# Patient Record
Sex: MALE | Race: BLACK OR AFRICAN AMERICAN | Employment: FULL TIME | URBAN - METROPOLITAN AREA
[De-identification: names, ages, dates, MRNs, and addresses within clinical notes are randomized per-mention and may not be internally consistent; named-entity substitution may affect disease eponyms.]

---

## 2022-09-27 ENCOUNTER — HOSPITAL ENCOUNTER (EMERGENCY)
Age: 42
Discharge: HOME | End: 2022-09-27
Attending: EMERGENCY MEDICINE

## 2022-09-27 ENCOUNTER — APPOINTMENT (OUTPATIENT)
Dept: GENERAL RADIOLOGY | Age: 42
End: 2022-09-27

## 2022-09-27 VITALS
RESPIRATION RATE: 16 BRPM | OXYGEN SATURATION: 100 % | BODY MASS INDEX: 29.06 KG/M2 | TEMPERATURE: 98.4 F | HEIGHT: 70 IN | SYSTOLIC BLOOD PRESSURE: 168 MMHG | DIASTOLIC BLOOD PRESSURE: 125 MMHG | WEIGHT: 203 LBS | HEART RATE: 82 BPM

## 2022-09-27 DIAGNOSIS — R03.0 ELEVATED BLOOD PRESSURE READING: ICD-10-CM

## 2022-09-27 DIAGNOSIS — M79.644 FINGER PAIN, RIGHT: ICD-10-CM

## 2022-09-27 DIAGNOSIS — M70.52 SUPRAPATELLAR BURSITIS OF LEFT KNEE: Primary | ICD-10-CM

## 2022-09-27 PROCEDURE — 73140 X-RAY EXAM OF FINGER(S): CPT

## 2022-09-27 PROCEDURE — 73562 X-RAY EXAM OF KNEE 3: CPT

## 2022-09-27 PROCEDURE — 99283 EMERGENCY DEPT VISIT LOW MDM: CPT

## 2022-09-27 PROCEDURE — 6370000000 HC RX 637 (ALT 250 FOR IP): Performed by: EMERGENCY MEDICINE

## 2022-09-27 RX ORDER — GLIPIZIDE 5 MG/1
5 TABLET ORAL
COMMUNITY

## 2022-09-27 RX ORDER — NAPROXEN 250 MG/1
250 TABLET ORAL 2 TIMES DAILY PRN
Qty: 20 TABLET | Refills: 0 | Status: SHIPPED | OUTPATIENT
Start: 2022-09-27

## 2022-09-27 RX ORDER — NAPROXEN 250 MG/1
500 TABLET ORAL ONCE
Status: COMPLETED | OUTPATIENT
Start: 2022-09-27 | End: 2022-09-27

## 2022-09-27 RX ORDER — ESOMEPRAZOLE MAGNESIUM 20 MG/1
20 FOR SUSPENSION ORAL
COMMUNITY

## 2022-09-27 RX ADMIN — NAPROXEN 500 MG: 250 TABLET ORAL at 07:46

## 2022-09-27 ASSESSMENT — PAIN SCALES - GENERAL
PAINLEVEL_OUTOF10: 9
PAINLEVEL_OUTOF10: 9

## 2022-09-27 ASSESSMENT — PAIN - FUNCTIONAL ASSESSMENT: PAIN_FUNCTIONAL_ASSESSMENT: 0-10

## 2022-09-27 NOTE — ED PROVIDER NOTES
[address]      CHIEF COMPLAINT  Knee Pain (Pt states that he went to a party Saturday night and then after he noticed that his left knee has a knot and started hurting. Pt states that he didn't hear a pop or twist it but did notice it hurting. Pt states he is a  and walking on his leg hurts as well as getting in and out. Pt states that he also went bowling a few months ago and his right ring finger started hurting and never has stopped )       HISTORY OF PRESENT ILLNESS  Karli Morocho is a 36 y.o. male  who presents to the ED complaining of left knee pain. Symptoms started on Sunday. He states that it hurts worse when he tries to walk or tries to extend his knee or his own power. Hurts superior to the knee the suprapatellar area feels swollen. Is tender when he touches it. No fevers. He denies any fall or direct trauma. He does not twisted that he is aware of. He did go to a party the night before but was not doing anything particularly exertional or dancing a lot. He states that he works as a  and is concerned about this pain is worse walking and getting in and out of the vehicle. He denies any numbness tingling or weakness. He has taken Tylenol for the pain but is persisted. He also is complaining of some discomfort to the right ring finger has been ongoing since bowling quite a bit ago. He states that especially when he flexes and extends the finger feels that it cracks and pops. It is painful especially when he does that but is able to fully move it without any focal numbness tingling or weakness. No other complaints, modifying factors or associated symptoms. I have reviewed the following from the nursing documentation. History reviewed. No pertinent past medical history. History reviewed. No pertinent surgical history. History reviewed. No pertinent family history.   Social History     Socioeconomic History    Marital status: Single     Spouse name: Not on file    Number of children: Not on file    Years of education: Not on file    Highest education level: Not on file   Occupational History    Not on file   Tobacco Use    Smoking status: Some Days     Types: Cigarettes    Smokeless tobacco: Never   Substance and Sexual Activity    Alcohol use: Yes    Drug use: Yes     Types: Marijuana Charmayne Stai)     Comment: twice a week    Sexual activity: Not on file   Other Topics Concern    Not on file   Social History Narrative    Not on file     Social Determinants of Health     Financial Resource Strain: Not on file   Food Insecurity: Not on file   Transportation Needs: Not on file   Physical Activity: Not on file   Stress: Not on file   Social Connections: Not on file   Intimate Partner Violence: Not on file   Housing Stability: Not on file     No current facility-administered medications for this encounter. Current Outpatient Medications   Medication Sig Dispense Refill    glipiZIDE (GLUCOTROL) 5 MG tablet Take 5 mg by mouth 2 times daily (before meals)      metFORMIN (GLUCOPHAGE) 500 MG tablet Take 500 mg by mouth 2 times daily (with meals)      esomeprazole Magnesium (NEXIUM) 20 MG PACK Take 20 mg by mouth      naproxen (NAPROSYN) 250 MG tablet Take 1 tablet by mouth 2 times daily as needed for Pain Take with food 20 tablet 0     No Known Allergies    REVIEW OF SYSTEMS  10 systems reviewed, pertinent positives per HPI otherwise noted to be negative. PHYSICAL EXAM  BP (!) 165/114   Pulse 82   Temp 98.4 °F (36.9 °C) (Oral)   Resp 16   Ht 5' 10\" (1.778 m)   Wt 203 lb (92.1 kg)   SpO2 100%   BMI 29.13 kg/m²    GENERAL APPEARANCE: Awake and alert. Cooperative. No acute distress. HENT: Normocephalic. Atraumatic. Mucous membranes are moist.  No drooling or stridor. NECK: Supple. EYES: PERRL. EOM's grossly intact. HEART/CHEST: RRR. No murmurs. 2+ radial pulse on the right. LUNGS: Respirations unlabored. CTAB. Good air exchange. Speaking comfortably in full sentences. ABDOMEN: No tenderness. Soft. Non-distended. No masses. No organomegaly. No guarding or rebound. MUSCULOSKELETAL: Patient with swelling in the suprapatellar area the left knee with tenderness especially to that area but also diffuse discomfort anteriorly with very small left knee effusion also noted. No tenderness in the popliteal fossa or palpable cords. Patient able to flex and extend the knee but with pain. No laxity on valgus and varus. Negative anterior and posterior drawer signs. No overlying erythema, rashes or increased warmth to the knee. Patient with tenderness over the right fourth PIP joint without any warmth or erythema. Compartments soft. No deformity. All extremities neurovascularly intact. SKIN: Warm and dry. No acute rashes. NEUROLOGICAL: Alert and oriented. CN's 2-12 intact. No gross facial drooping. Strength 5/5, sensation intact. No gross focal deficits. PSYCHIATRIC: Normal mood and affect. LABS  I have reviewed all labs for this visit. No results found for this visit on 09/27/22. RADIOLOGY  XR KNEE LEFT (3 VIEWS)    Result Date: 9/27/2022  EXAMINATION: THREE XRAY VIEWS OF THE LEFT KNEE 9/27/2022 7:45 am COMPARISON: None. HISTORY: ORDERING SYSTEM PROVIDED HISTORY: left suprapatellar pain/swelling TECHNOLOGIST PROVIDED HISTORY: Reason for exam:->left suprapatellar pain/swelling FINDINGS: No evidence of acute fracture or dislocation. No focal osseous lesion. No evidence of joint effusion. No focal soft tissue abnormality. No acute abnormality of the knee. XR FINGER RIGHT (MIN 2 VIEWS)    No evidence of acute fracture. Follow-up examination recommended in 7-10 days if clinically indicated. ED COURSE/MDM  Patient seen and evaluated. Old records reviewed. Imaging reviewed and results discussed with patient. Patient presenting for evaluation of left knee pain and has likely suprapatellar bursitis.   He declined crutches for some alleviation of weightbearing but I will place him in an Ace bandage and we discussed supportive exercises and need for orthopedic follow-up. I will give him a short course of NSAIDs but also discussed other treatments that may be helpful such as topical NSAIDs and lieu of the oral medications if possible. He was given orthopedic referral.  He does have some right ring finger pain from previous injury while bowling I suspect that he likely sprained his finger. Will treat supportively with exercises as it has now been several months since that initial injury and have him follow-up with orthopedics regarding them as well. Plain films of both the knee and the finger were unremarkable. No evidence of any ongoing infection. Patient and his significant other were in agreement of that plan and all questions were answered at time of discharge. Of note, patient's blood pressure was elevated and he will follow-up as an outpatient to recheck that as well and this was discussed with him as well. I estimate there is LOW risk for COMPARTMENT SYNDROME, DEEP VENOUS THROMBOSIS, SEPTIC ARTHRITIS, TENDON OR NEUROVASCULAR INJURY, thus I consider the discharge disposition reasonable. Gera Shaw and I have discussed the diagnosis and risks, and we agree with discharging home to follow-up with their primary doctor or the referral orthopedist. We also discussed returning to the Emergency Department immediately if new or worsening symptoms occur. We have discussed the symptoms which are most concerning (e.g., changing or worsening pain, numbness, weakness) that necessitate immediate return. I, Dr. Sergey Petersen MD, am the primary clinician of record. Is this patient to be included in the SEP-1 Core Measure? No   Exclusion criteria - the patient is NOT to be included for SEP-1 Core Measure due to:   Infection is not suspected     During the patient's ED course, the patient was given:  Medications naproxen (NAPROSYN) tablet 500 mg (500 mg Oral Given 9/27/22 0746)        CLINICAL IMPRESSION  1. Suprapatellar bursitis of left knee    2. Finger pain, right    3. Elevated blood pressure reading        Blood pressure (!) 165/114, pulse 82, temperature 98.4 °F (36.9 °C), temperature source Oral, resp. rate 16, height 5' 10\" (1.778 m), weight 203 lb (92.1 kg), SpO2 100 %. DISPOSITION  Domitila Escalante was discharged to home in stable condition. Patient was given scripts for the following medications. I counseled patient how to take these medications. New Prescriptions    NAPROXEN (NAPROSYN) 250 MG TABLET    Take 1 tablet by mouth 2 times daily as needed for Pain Take with food       Follow-up with:  MERARY Garcia - CNP  [address]  [address] [facility] [place]    Schedule an appointment as soon as possible for a visit in 2 days  For recheck of your blood pressure    Adam Alvarenga MD  [address] [address]  [phone]    Schedule an appointment as soon as possible for a visit   to follow up with orthopedics    DISCLAIMER: This chart was created using Dragon dictation software. Efforts were made by me to ensure accuracy, however some errors may be present due to limitations of this technology and occasionally words are not transcribed correctly.         Mary Stinson MD  09/27/22 7553 Yes...